# Patient Record
Sex: MALE | URBAN - METROPOLITAN AREA
[De-identification: names, ages, dates, MRNs, and addresses within clinical notes are randomized per-mention and may not be internally consistent; named-entity substitution may affect disease eponyms.]

---

## 2017-06-22 ENCOUNTER — OUTPATIENT (OUTPATIENT)
Dept: OUTPATIENT SERVICES | Facility: HOSPITAL | Age: 62
LOS: 1 days | End: 2017-06-22
Payer: COMMERCIAL

## 2017-06-22 ENCOUNTER — APPOINTMENT (OUTPATIENT)
Dept: CV DIAGNOSITCS | Facility: HOSPITAL | Age: 62
End: 2017-06-22

## 2017-06-22 DIAGNOSIS — R07.9 CHEST PAIN, UNSPECIFIED: ICD-10-CM

## 2017-06-22 PROCEDURE — 93016 CV STRESS TEST SUPVJ ONLY: CPT

## 2017-06-22 PROCEDURE — 93018 CV STRESS TEST I&R ONLY: CPT

## 2022-11-10 LAB
ALB/GLOBRATIO, 58C: 1.7 (CALC) (ref 1–2.5)
ALB/GLOBRATIO, 58C: 1.7 (CALC) (ref 1–2.5)
ALBUMIN SERPL-MCNC: 4.4 G/DL (ref 3.6–5.1)
ALBUMIN SERPL-MCNC: 4.4 G/DL (ref 3.6–5.1)
ALKALINE PHOSPHATASE, TOTAL, 25002000: 44 U/L (ref 35–144)
ALKALINE PHOSPHATASE, TOTAL, 25002000: 44 U/L (ref 35–144)
ALT SERPL-CCNC: 19 U/L (ref 9–46)
ALT SERPL-CCNC: 19 U/L (ref 9–46)
AST SERPL W P-5'-P-CCNC: 12 U/L (ref 10–35)
AST SERPL W P-5'-P-CCNC: 12 U/L (ref 10–35)
BASOPHILS # BLD: 31 CELLS/UL (ref 0–200)
BASOPHILS NFR BLD: 0.5 % (ref 0–2)
BILIRUB SERPL-MCNC: 0.4 MG/DL (ref 0.2–1.2)
BILIRUB SERPL-MCNC: 0.4 MG/DL (ref 0.2–1.2)
BILIRUBIN, DIRECT,CBIL: 0.1 MG/DL (ref 0–0.2)
BILIRUBIN, INDIRECT,UBIL: 0.3 MG/DL (CALC) (ref 0.2–1.2)
BUN SERPL-MCNC: 19 MG/DL (ref 7–25)
BUN/CREATININE RATIO,BUCR: ABNORMAL (CALC) (ref 6–22)
CALCIUM SERPL-MCNC: 10.3 MG/DL (ref 8.6–10.3)
CHLORIDE SERPL-SCNC: 104 MMOL/L (ref 98–110)
CHOL/HDL RATIO,CHHDX: 4.5 (CALC)
CHOLEST SERPL-MCNC: 141 MG/DL
CK SERPL-CCNC: 75 U/L (ref 44–196)
CO2 SERPL-SCNC: 24 MMOL/L (ref 20–32)
CREAT SERPL-MCNC: 1.1 MG/DL (ref 0.7–1.35)
CREATININE URINE,9612018: 140 MG/DL (ref 20–320)
EAG (MG/DL),9916804: 209 MG/DL
EAG (MMOL/L),9916805: 11.6 MMOL/L
EGFR: 73 ML/MIN/1.73M2
EOSINOPHIL # BLD: 140 CELLS/UL (ref 15–500)
EOSINOPHIL NFR BLD: 2.3 % (ref 0–8)
ERYTHROCYTE [DISTWIDTH] IN BLOOD BY AUTOMATED COUNT: 11.7 % (ref 11–15)
GLOBULIN,GLOB: 2.6 G/DL (CALC) (ref 1.9–3.7)
GLOBULIN,GLOB: 2.6 G/DL (CALC) (ref 1.9–3.7)
GLUCOSE SERPL-MCNC: 196 MG/DL (ref 65–99)
HBA1C MFR BLD HPLC: 8.9 % OF TOTAL HGB
HCT VFR BLD AUTO: 47.1 % (ref 38.5–50)
HDLC SERPL-MCNC: 31 MG/DL
HGB BLD-MCNC: 15.6 G/DL (ref 13.2–17.1)
LDL-CHOLESTEROL: 63 MG/DL (CALC)
LYMPHOCYTES # BLD: 1836 CELLS/UL (ref 850–3900)
LYMPHOCYTES NFR BLD: 30.1 % (ref 15–49)
MAGNESIUM SERPL-MCNC: 1.5 MG/DL (ref 1.5–2.5)
MCH RBC QN AUTO: 30.1 PG (ref 27–33)
MCHC RBC AUTO-ENTMCNC: 33.1 G/DL (ref 32–36)
MCV RBC AUTO: 90.9 FL (ref 80–100)
MICROALBUMIN,URINE RANDOM 140054: 0.7 MG/DL
MICROALBUMIN/CREAT RATIO: 5 MCG/MG CREAT
MONOCYTES # BLD: 482 CELLS/UL (ref 200–950)
MONOCYTES NFR BLD: 7.9 % (ref 0–13)
NEUTROPHILS # BLD AUTO: 3611 CELLS/UL (ref 1500–7800)
NEUTROPHILS # BLD: 59.2 % (ref 38–80)
NON-HDL CHOLESTEROL, 011976: 110 MG/DL (CALC)
PLATELET # BLD AUTO: 158 THOUSAND/UL (ref 140–400)
PMV BLD AUTO: 12.2 FL (ref 7.5–12.5)
POTASSIUM SERPL-SCNC: 4.5 MMOL/L (ref 3.5–5.3)
PROT SERPL-MCNC: 7 G/DL (ref 6.1–8.1)
PROT SERPL-MCNC: 7 G/DL (ref 6.1–8.1)
RBC # BLD AUTO: 5.18 MILLION/UL (ref 4.2–5.8)
SODIUM SERPL-SCNC: 138 MMOL/L (ref 135–146)
TRIGL SERPL-MCNC: 391 MG/DL (ref ?–150)
TSH SERPL DL<=0.005 MIU/L-ACNC: 1.08 MIU/L (ref 0.4–4.5)
WBC # BLD AUTO: 6.1 THOUSAND/UL (ref 3.8–10.8)

## 2025-07-23 ENCOUNTER — TELEPHONE (OUTPATIENT)
Age: 70
End: 2025-07-23

## 2025-07-23 NOTE — TELEPHONE ENCOUNTER
New Patient   Insurance   Current Insurance? Medicare  Insurance E-verified? Yes    History   Reason for appointment/active symptoms?  Prostate Cancer Screening   Establish care     Has the patient had any previous Urologist(s)? [facility] Urologist    Was the patient seen in the ED? No    Labs/Imaging(Including Out Of Network)?  No     Records Requested? Yes - fax provided  Records Visible in EPIC?    Personal history of cancer?    Appointment   Office location preference: [place]    Appointment Details   Date: 10/9/25  Time: 1:40pm  Location: [place]  Provider: Richa Cantrell    Does the appointment need further review? No